# Patient Record
(demographics unavailable — no encounter records)

---

## 2018-04-13 NOTE — XRAY PRELIMINARY REPORT
Accession: [id number]

Exam: XR CHEST 1 VIEW X-RAY

 

IMPRESSION: Small bilateral pleural effusions. Pulmonary venous congestion. Mild diffuse bilateral in
terstitial and hazy opacities concerning for mild pulmonary edema.

 

[facility]

 

SITE ID: [id number]

## 2018-04-13 NOTE — XRAY REPORT
EXAM: 

CHEST RADIOGRAPHY

 

EXAM DATE: 4/13/2018 08:14 PM.

 

CLINICAL HISTORY: Dyspnea.

 

COMPARISON: None.

 

TECHNIQUE: 1 view.

 

FINDINGS: Small bilateral pleural effusions. Pulmonary venous congestion. Mild diffuse bilateral inte
rstitial and hazy opacities concerning for mild pulmonary edema. Prominent heart size. No pneumothora
x.

 

IMPRESSION: Small bilateral pleural effusions. Pulmonary venous congestion. Mild diffuse bilateral in
terstitial and hazy opacities concerning for mild pulmonary edema.

 

[facility]

Referring Provider Line: [phone]

 

SITE ID: [id number]

## 2018-04-13 NOTE — ED PHYSICIAN DOCUMENTATION
PD HPI CHEST PAIN





- Stated complaint


Stated Complaint: NAUSEA/WEAKNESS GENERALIZED





- Chief complaint


Chief Complaint: Ext Problem





- History obtained from


History obtained from: Patient





- History of Present Illness


Timing - onset: Other (70-year-old with history of breast cancer and diabetes 

presents with 2-3 nights of feeling restless and trouble sleeping due to 

shortness of breath and today has profound tachycardia.  Blood sugars also been 

high.)





Review of Systems


Ten Systems: 10 systems reviewed and negative


Constitutional: denies: Fever, Chills


Nose: denies: Rhinorrhea / runny nose, Congestion


Cardiac: reports: Chest pain / pressure, Palpitations


Respiratory: reports: Dyspnea, Cough


GI: reports: Nausea.  denies: Abdominal Pain, Vomiting





PD PAST MEDICAL HISTORY





- Past Medical History


Cardiovascular: High cholesterol


Endocrine/Autoimmune: Type 2 diabetes





- Past Surgical History


/GYN: Mastectomy





- Present Medications


Home Medications: 


 Ambulatory Orders











 Medication  Instructions  Recorded  Confirmed


 


Ascorbic Acid [Vitamin C] 500 mg PO DAILY 09/19/17 03/12/18


 


Cholecalciferol (Vitamin D3) 4,000 unit PO DAILY 09/19/17 03/12/18





[Vitamin D]   


 


Metformin HCl 1,000 mg PO BIDWM 09/19/17 03/12/18


 


Simvastatin 40 mg PO DAILY 09/19/17 03/12/18


 


Vitamin E (Dl,Tocopheryl Acet) 400 unit PO DAILY 09/19/17 03/12/18





[Vitamin E]   


 


glipiZIDE [Glipizide] 10 mg PO QDBREAKFAST 03/12/18 03/12/18


 


Biotin  04/13/18 04/13/18














- Allergies


Allergies/Adverse Reactions: 


 Allergies











Allergy/AdvReac Type Severity Reaction Status Date / Time


 


No Known Drug Allergies Allergy   Verified 04/13/18 19:53














- Social History


Smoking Status: Never smoker





- Family History


Family history: reports: Non contributory





PD ED PE NORMAL





- Vitals


Vital signs reviewed: Yes





- General


General: Alert and oriented X 3, No acute distress





- HEENT


HEENT: PERRL, EOMI





- Neck


Neck: Supple, no meningeal sign, No bony TTP





- Cardiac


Cardiac: Other (Rapid and irregular without murmur)





- Respiratory


Respiratory: No respiratory distress, Clear bilaterally





- Abdomen


Abdomen: Normal bowel sounds, Non tender





- Back


Back: No CVA TTP, No spinal TTP





- Derm


Derm: Normal color, Warm and dry, No rash





- Extremities


Extremities: Other (Slight pitting pedal edema)





- Neuro


Neuro: Alert and oriented X 3, Normal speech





- Psych


Psych: Normal mood, Normal affect





Results





- Vitals


Vitals: 


 Vital Signs - 24 hr











  04/13/18 04/13/18 04/13/18





  19:32 19:47 20:03


 


Temperature 36.9 C 37 C 


 


Heart Rate 175 H 182 H 172 H


 


Respiratory 18 17 35 H





Rate   


 


Blood Pressure 146/101 H 147/104 H 132/106 H


 


O2 Saturation 99 98 95














  04/13/18 04/13/18 04/13/18





  20:14 20:22 20:38


 


Temperature   


 


Heart Rate 155 H 148 H 133 H


 


Respiratory 30 H 31 H 31 H





Rate   


 


Blood Pressure 118/83 H  138/79 H


 


O2 Saturation 93 96 94














  04/13/18 04/13/18 04/13/18





  21:19 21:24 21:31


 


Temperature   


 


Heart Rate 75 74 74


 


Respiratory 33 H 39 H 30 H





Rate   


 


Blood Pressure 98/64 69/45 L 75/52 L


 


O2 Saturation 96 97 96














  04/13/18 04/13/18 04/13/18





  21:34 21:41 21:45


 


Temperature   


 


Heart Rate 74 74 73


 


Respiratory 34 H 30 H 32 H





Rate   


 


Blood Pressure 73/54 L 73/57 L 72/62 L


 


O2 Saturation 96 96 96














  04/13/18





  21:51


 


Temperature 


 


Heart Rate 73


 


Respiratory 35 H





Rate 


 


Blood Pressure 73/57 L


 


O2 Saturation 97








 Oxygen











O2 Source                      Room air

















- EKG (time done)


  ** 1943


Rate: Rate (enter#) (185)


Rhythm: Atrial fibrillation


Axis: Normal


Ischemia: Non specific changes (ST depression, especially laterally, probably 

rate related, also some LVH.)


Computer interpretation: Agree with computer





  ** 2123


Rate: Rate (enter#) (74)


Rhythm: NSR


Axis: Normal


Intervals: Normal WI


QRS: Normal


Ischemia: T wave inversion (V3-V5), Non specific changes.  No: ST elevation c/w 

ischemia


Computer interpretation: Agree with computer





  ** 2205


Rate: Rate (enter#) (77)


Rhythm: NSR


Axis: Normal


QRS: LVH


Ischemia: T wave inversion (V4 through V6, note that the tech says he moved to 

the leads from the second EKG, it is different, but this could be explained by 

that.).  No: ST elevation c/w ischemia


Computer interpretation: Agree with computer





- Labs


Labs: 


 Laboratory Tests











  04/13/18 04/13/18 04/13/18





  19:57 19:57 19:57


 


WBC  12.9 H  


 


RBC  4.84  


 


Hgb  13.6  


 


Hct  41.9  


 


MCV  86.5  


 


MCH  28.2  


 


MCHC  32.6  


 


RDW  13.8  


 


Plt Count  348  


 


MPV  8.7  


 


Neut #  9.3 H  


 


Lymph #  2.7  


 


Mono #  0.8  


 


Eos #  0.0  


 


Baso #  0.1  


 


Absolute Nucleated RBC  0.00  


 


Nucleated RBC %  0.0  


 


PT   13.6 H 


 


INR   1.2 


 


Sodium    133 L


 


Potassium    4.3


 


Chloride    100 L


 


Carbon Dioxide    23


 


Anion Gap    10.0


 


BUN    15


 


Creatinine    0.9


 


Estimated GFR (MDRD)    62 L


 


Glucose    227 H


 


Calcium    9.6


 


Total Bilirubin    0.8


 


AST    35


 


ALT    34


 


Alkaline Phosphatase    69


 


Troponin I   


 


B-Natriuretic Peptide   


 


Total Protein    8.2


 


Albumin    4.5


 


Globulin    3.7


 


Albumin/Globulin Ratio    1.2


 


Lipase    21 L


 


TSH   














  04/13/18 04/13/18 04/13/18





  19:57 19:57 19:57


 


WBC   


 


RBC   


 


Hgb   


 


Hct   


 


MCV   


 


MCH   


 


MCHC   


 


RDW   


 


Plt Count   


 


MPV   


 


Neut #   


 


Lymph #   


 


Mono #   


 


Eos #   


 


Baso #   


 


Absolute Nucleated RBC   


 


Nucleated RBC %   


 


PT   


 


INR   


 


Sodium   


 


Potassium   


 


Chloride   


 


Carbon Dioxide   


 


Anion Gap   


 


BUN   


 


Creatinine   


 


Estimated GFR (MDRD)   


 


Glucose   


 


Calcium   


 


Total Bilirubin   


 


AST   


 


ALT   


 


Alkaline Phosphatase   


 


Troponin I  0.28  


 


B-Natriuretic Peptide   819 H 


 


Total Protein   


 


Albumin   


 


Globulin   


 


Albumin/Globulin Ratio   


 


Lipase   


 


TSH    2.88














- Rads (name of study)


  ** 1v chest


Radiology: EMP read contemporaneously (Small bilateral pleural effusions 

pulmonary venous congestion and mild pulmonary edema.)





PD MEDICAL DECISION MAKING





- ED course


ED course: 





70-year-old woman with acute A. fib, potentially more than a couple of days so 

really would not feel comfortable purposely cardioverting her.  She was 

administered divided doses of diltiazem with improved rate down to about 115-

120 but then she was given some metoprolol and she almost immediately converted 

to a sinus rhythm.  An EKG was performed.  Of note her diagnostic suggested she 

is in CHF and she has mild troponinemia.  She was administered Lovenox here as 

well as aspirin.


Little bit after the metoprolol she did become hypotensive and was administered 

cautious IV fluids.  Spoke with Dr. Pringle for admission at 9:35 PM.


By the time she was admitted to the ICU her blood pressure was almost normal 

again, Dr. Pringle had initially asked me to put in a central line, but I 

preferred to wait given that her blood pressure was rebounding at that point 

and it did continue to rebound.





- Critical Care


Time(min): 42


Time Includes: Direct patient care, Review records, Reassess patient, Document 

care, Coordinate care, Medical consult, Family consult for tx dec


Data interpretation: Labs, Pulse ox


Procedures excluded from critical care time: EKG (x2)





Departure





- Departure


Disposition: [facility] DC/Xfer


Clinical Impression: 


Atrial fibrillation


Qualifiers:


 Atrial fibrillation type: paroxysmal Qualified Code(s): I48.0 - Paroxysmal 

atrial fibrillation





Congestive heart failure


Qualifiers:


 Heart failure type: other Qualified Code(s): I50.9 - Heart failure, unspecified





Condition: Serious


Discharge Date/Time: 04/13/18 22:47

## 2018-04-14 NOTE — PROVIDER PROGRESS NOTE
Subjective





- Prog Note Date


Prog Note Date: 04/14/18


Prog Note Time: 13:30





- Subjective


Pt reports feeling: Improved


Subjective: 





The patient is breathing easier.  She denies any chest pain or any other new 

problems.  She still feels quite weak.  She denies any fever or chills.





Current Medications





- Current Medications


Current Medications: 





Active Medications





Acetaminophen (Tylenol)  650 mg PO Q4HR PRN


   PRN Reason: Pain 1 to 4


Aspirin ([place] Aspirin)  81 mg PO DAILY [facility]


Atorvastatin Calcium (Lipitor)  20 mg PO QPM [facility]


   Last Admin: 04/14/18 00:05 Dose:  20 mg


Carvedilol (Coreg)  3.125 mg PO BID [facility]


Cholecalciferol (Vitamin D3)  4,000 unit PO DAILY [facility]


   Last Admin: 04/14/18 08:14 Dose:  4,000 unit


Enoxaparin Sodium (Lovenox)  70 mg SUBQ BID [facility]


   Last Admin: 04/14/18 08:57 Dose:  70 mg


Furosemide (Lasix)  20 mg PO DAILY [facility]


Glipizide (Glucotrol)  10 mg PO 0730 [facility]


   Last Admin: 04/14/18 08:13 Dose:  10 mg


Ibuprofen (Motrin)  600 mg PO Q6HR PRN


   PRN Reason: PAIN


   Last Admin: 04/14/18 09:13 Dose:  600 mg


Insulin Aspart (Novolog)  1 - 9 unit SUBQ 0800,1200,1700,2100 [facility]


   PRN Reason: Protocol


Lisinopril (Zestril)  5 mg PO DAILY [facility]


Metformin HCl (Glucophage)  1,000 mg PO BIDWM [facility]


   Last Admin: 04/14/18 08:14 Dose:  1,000 mg


Metformin HCl (Glucophage)  500 mg PO DAILY@1200 [facility]


   Last Admin: 04/14/18 11:57 Dose:  500 mg


Morphine Sulfate (Morphine)  2 mg IVP Q2H PRN


   PRN Reason: Dyspnea


   Last Admin: 04/14/18 05:43 Dose:  2 mg


Ondansetron HCl (Zofran Inj)  4 mg IVP Q6HR PRN


   PRN Reason: Nausea / Vomiting


   Last Admin: 04/14/18 05:45 Dose:  4 mg


Pantoprazole Sodium (Protonix)  40 mg IVP BID [facility]


   Last Admin: 04/14/18 08:22 Dose:  40 mg


Phenol/Menthol (Chloraseptic)  2 sprays MM Q2HR PRN


   PRN Reason: Throat Pain


Potassium Chloride (K-Dur)  10 meq PO DAILYWM [facility]


Sodium Chloride (Normal Saline Flush 0.9%)  10 ml IVP 0100,0900,1700 [facility]


   Last Admin: 04/14/18 08:22 Dose:  10 ml


Sodium Chloride (Normal Saline Flush 0.9%)  10 ml IVP PRN PRN


   PRN Reason: AS NEEDED PER PROVIDER ORDERS


   Last Admin: 04/14/18 05:43 Dose:  10 ml


Throat Lozenges (Cepacol)  1 lozenge MM Q2HR PRN


   PRN Reason: Throat pain


   Last Admin: 04/14/18 05:03 Dose:  1 lozenge





Home medications:


 





Ascorbic Acid [Vitamin C] 500 mg PO DAILY 09/19/17 


Cholecalciferol (Vitamin D3) [Vitamin D] 4,000 unit PO DAILY 09/19/17 


Metformin HCl 1,000 mg PO BIDWM 09/19/17 


Simvastatin 40 mg PO QPM 09/19/17 


Vitamin E (Dl,Tocopheryl Acet) [Vitamin E] 400 unit PO DAILY 09/19/17 


glipiZIDE [Glipizide] 10 mg PO 0730 03/12/18 


Metformin HCl 500 mg PO 1200 04/14/18 











Objective





- Vital Signs/Intake & Output


Reviewed Vital Signs: Yes


Vital Signs: 


 Vital Signs x48h











  Temp Pulse Resp BP Pulse Ox


 


 04/14/18 14:00   85  22  94/57 L  97


 


 04/14/18 13:00   87  29 H  93/67  99


 


 04/14/18 12:00  36.8 C  88  19  96/66  97


 


 04/14/18 11:00   88  30 H  106/76  98


 


 04/14/18 10:00   92  24  129/75  95


 


 04/14/18 09:00  36.7 C  99  29 H  113/99 H  97


 


 04/14/18 08:00   112 H  25 H  133/63 H  96


 


 04/14/18 07:00   119 H  35 H  129/100 H  93











Intake & Output: 


 Intake & Output











 04/11/18 04/12/18 04/13/18 04/14/18





 23:59 23:59 23:59 23:59


 


Intake Total   1410 1198.333


 


Output Total    750


 


Balance   1410 448.333














- Objective


General Appearance: positive: No acute distress, Alert


Eyes Bilateral: positive: Normal inspection, PERRL, EOMI, No lid inflammation, 

Conjunctivae nml, No scleral icterus


ENT: positive: ENT inspection nml, Pharynx nml, No signs of dehydration


Neck: positive: Nml inspection, Thyroid nml, Trachea midline.  negative: 

Thyromegaly


Respiratory: positive: Chest non-tender, No respiratory distress, Breath sounds 

nml.  negative: Wheezes, Rales, Rhonchi


Cardiovascular: positive: Regular rate & rhythm, No murmur, No gallop


Abdomen: positive: Non-tender, No organomegaly, Nml bowel sounds, No 

distention.  negative: Guarding, Rebound


Back: positive: Nml inspection.  negative: CVA tenderness (R), CVA tenderness (L

)


Skin: positive: Color nml, No rash, Warm, Dry.  negative: Cyanosis


Extremities: positive: Non-tender, Full ROM, Nml appearance, Pedal edema (Trace 

pedal edema)


Neurologic/Psychiatric: positive: Oriented x3, CN's nml (2-12), Motor nml, 

Sensation nml, Mood/affect nml





- Lab Results


Fish Bones: 


 04/14/18 05:00





 04/14/18 05:00


Other Labs: 


 Lab Results x24hrs











  04/14/18 04/14/18 04/14/18 Range/Units





  11:28 08:08 05:00 


 


WBC     (4.8-10.8)  x10^3/uL


 


RBC     (4.20-5.40)  10^6/uL


 


Hgb     (12.0-16.0)  g/dL


 


Hct     (37.0-47.0)  %


 


MCV     (81.0-99.0)  fL


 


MCH     (27.0-31.0)  pg


 


MCHC     (32.0-36.0)  g/dL


 


RDW     (12.0-15.0)  %


 


Plt Count     (130-450)  10^3/uL


 


MPV     (7.9-10.8)  fL


 


Neut #     (1.5-6.6)  10^3/uL


 


Lymph #     (1.5-3.5)  10^3/uL


 


Mono #     (0.0-1.0)  10^3/uL


 


Eos #     (0.0-0.7)  10^3/uL


 


Baso #     (0.0-0.1)  10^3/uL


 


Absolute Nucleated RBC     x10^3/uL


 


Nucleated RBC %     /100WBC


 


Sodium     (135-145)  mmol/L


 


Potassium     (3.5-5.0)  mmol/L


 


Chloride     (101-111)  mmol/L


 


Carbon Dioxide     (21-32)  mmol/L


 


Anion Gap     (6-13)  


 


BUN     (6-20)  mg/dL


 


Creatinine     (0.4-1.0)  mg/dL


 


Estimated GFR (MDRD)     (>89)  


 


Glucose     ()  mg/dL


 


POC Whole Bld Glucose  274 H  265 H   (70 - 100)  mg/dL


 


Glycated Hemoglobin    6.6 H  (4.6-6.2)  %


 


Estim Average Glucose    143 H  ()  


 


Calcium     (8.5-10.3)  mg/dL


 


Phosphorus     (2.5-4.6)  mg/dL


 


Magnesium     (1.7-2.8)  mg/dL


 


Troponin I     (<0.49)  ng/mL














  04/14/18 04/14/18 04/14/18 Range/Units





  05:00 05:00 05:00 


 


WBC    10.9 H  (4.8-10.8)  x10^3/uL


 


RBC    4.21  (4.20-5.40)  10^6/uL


 


Hgb    12.2  (12.0-16.0)  g/dL


 


Hct    37.2  (37.0-47.0)  %


 


MCV    88.4  (81.0-99.0)  fL


 


MCH    28.9  (27.0-31.0)  pg


 


MCHC    32.7  (32.0-36.0)  g/dL


 


RDW    14.1  (12.0-15.0)  %


 


Plt Count    254  (130-450)  10^3/uL


 


MPV    9.8  (7.9-10.8)  fL


 


Neut #    8.3 H  (1.5-6.6)  10^3/uL


 


Lymph #    1.6  (1.5-3.5)  10^3/uL


 


Mono #    0.8  (0.0-1.0)  10^3/uL


 


Eos #    0.0  (0.0-0.7)  10^3/uL


 


Baso #    0.1  (0.0-0.1)  10^3/uL


 


Absolute Nucleated RBC    0.01  x10^3/uL


 


Nucleated RBC %    0.1  /100WBC


 


Sodium   134 L   (135-145)  mmol/L


 


Potassium   3.3 L   (3.5-5.0)  mmol/L


 


Chloride   100 L   (101-111)  mmol/L


 


Carbon Dioxide   20 L   (21-32)  mmol/L


 


Anion Gap   14.0 H   (6-13)  


 


BUN   14   (6-20)  mg/dL


 


Creatinine   0.6   (0.4-1.0)  mg/dL


 


Estimated GFR (MDRD)   99   (>89)  


 


Glucose   246 H   ()  mg/dL


 


POC Whole Bld Glucose     (70 - 100)  mg/dL


 


Glycated Hemoglobin     (4.6-6.2)  %


 


Estim Average Glucose     ()  


 


Calcium   8.8   (8.5-10.3)  mg/dL


 


Phosphorus   4.0   (2.5-4.6)  mg/dL


 


Magnesium   1.1 L   (1.7-2.8)  mg/dL


 


Troponin I  0.19    (<0.49)  ng/mL














  04/13/18 04/13/18 Range/Units





  22:59 22:14 


 


WBC    (4.8-10.8)  x10^3/uL


 


RBC    (4.20-5.40)  10^6/uL


 


Hgb    (12.0-16.0)  g/dL


 


Hct    (37.0-47.0)  %


 


MCV    (81.0-99.0)  fL


 


MCH    (27.0-31.0)  pg


 


MCHC    (32.0-36.0)  g/dL


 


RDW    (12.0-15.0)  %


 


Plt Count    (130-450)  10^3/uL


 


MPV    (7.9-10.8)  fL


 


Neut #    (1.5-6.6)  10^3/uL


 


Lymph #    (1.5-3.5)  10^3/uL


 


Mono #    (0.0-1.0)  10^3/uL


 


Eos #    (0.0-0.7)  10^3/uL


 


Baso #    (0.0-0.1)  10^3/uL


 


Absolute Nucleated RBC    x10^3/uL


 


Nucleated RBC %    /100WBC


 


Sodium    (135-145)  mmol/L


 


Potassium    (3.5-5.0)  mmol/L


 


Chloride    (101-111)  mmol/L


 


Carbon Dioxide    (21-32)  mmol/L


 


Anion Gap    (6-13)  


 


BUN    (6-20)  mg/dL


 


Creatinine    (0.4-1.0)  mg/dL


 


Estimated GFR (MDRD)    (>89)  


 


Glucose    ()  mg/dL


 


POC Whole Bld Glucose  273 H   (70 - 100)  mg/dL


 


Glycated Hemoglobin    (4.6-6.2)  %


 


Estim Average Glucose    ()  


 


Calcium    (8.5-10.3)  mg/dL


 


Phosphorus    (2.5-4.6)  mg/dL


 


Magnesium    (1.7-2.8)  mg/dL


 


Troponin I   0.19  (<0.49)  ng/mL














- Diagnostic Imaging


Diagnostic Imaging Results: positive: Prelim report reviewed


Diagnostic Imaging Comments: 


EXAM: 


CHEST RADIOGRAPHY 





EXAM DATE: 4/14/2018 08:57 AM. 





CLINICAL HISTORY: F/U pulmonary edema. 





COMPARISON: 04/13/2018. 





TECHNIQUE: 1 view. 





FINDINGS: 


Lungs/Pleura: Lung volumes are low. Mild increase blunting at the costophrenic 

angles with mild 


increased adjacent groundglass opacity. No pneumothorax. 





Mediastinum: Cardiac silhouette size appears stable mild enlarged, although 

partially obscured. 


Atherosclerotic vascular calcification. 





Other: None. 





IMPRESSION: Lung volumes remain low with mild increased small bilateral pleural 

effusions and 


adjacent groundglass opacity that may reflect a combination of edema, 

atelectasis and/or pneumonia 


in the proper settings. 








EXAM: 


CHEST RADIOGRAPHY 





EXAM DATE: 4/13/2018 08:14 PM. 





CLINICAL HISTORY: Dyspnea. 





COMPARISON: None. 





TECHNIQUE: 1 view. 





FINDINGS: Small bilateral pleural effusions. Pulmonary venous congestion. Mild 

diffuse bilateral 


interstitial and hazy opacities concerning for mild pulmonary edema. Prominent 

heart size. No 


pneumothorax. 





IMPRESSION: Small bilateral pleural effusions. Pulmonary venous congestion. 

Mild diffuse bilateral 


interstitial and hazy opacities concerning for mild pulmonary edema. 














Assessment/Plan





- Problem List


(1) Congestive heart failure


Impression: 


The patient presented to [facility] emergency department in congestive 

heart failure.  Subsequent testing has found her to have an ejection fraction 

of less than 20% with associated right-sided heart failure.  We are gently 

diuresing the patient and have started her on Coreg, lisinopril, and a baby 

aspirin.


Qualifiers: 


   Heart failure type: combined systolic and diastolic   Qualified Code(s): 

I50.9 - Heart failure, unspecified   





(2) Atrial fibrillation


Impression: 


The patient converted to normal sinus rhythm last night with the use of 

metoprolol.  We will stop metoprolol and place patient on Coreg for the alpha 

and beta blocking effects.


Qualifiers: 


   Atrial fibrillation type: paroxysmal   Qualified Code(s): I48.0 - Paroxysmal 

atrial fibrillation   





(3) Hyperlipidemia


Impression: 


The patient will continue taking 40 mg of simvastatin daily.








(4) Type 2 diabetes mellitus


Impression: 


We will continue the patient on glipizide and metformin with sliding scale 

coverage.

## 2018-04-14 NOTE — XRAY PRELIMINARY REPORT
Accession: [id number]

Exam: XR CHEST 1 VIEW X-RAY

 

IMPRESSION: Lung volumes remain low with mild increased small bilateral pleural effusions and adjacen
t groundglass opacity that may reflect a combination of edema, atelectasis and/or pneumonia in the pr
oper settings.

 

[facility]

 

SITE ID: [id number]

## 2018-04-14 NOTE — XRAY REPORT
EXAM: 

CHEST RADIOGRAPHY

 

EXAM DATE: 4/14/2018 08:57 AM.

 

CLINICAL HISTORY: F/U pulmonary edema.

 

COMPARISON: 04/13/2018.

 

TECHNIQUE: 1 view.

 

FINDINGS:

Lungs/Pleura: Lung volumes are low. Mild increase blunting at the costophrenic angles with mild incre
ased adjacent groundglass opacity. No pneumothorax.

 

Mediastinum: Cardiac silhouette size appears stable mild enlarged, although partially obscured. Ather
osclerotic vascular calcification.

 

Other: None.

 

IMPRESSION: Lung volumes remain low with mild increased small bilateral pleural effusions and adjacen
t groundglass opacity that may reflect a combination of edema, atelectasis and/or pneumonia in the pr
oper settings.

 

[facility]

Referring Provider Line: [phone]

 

SITE ID: [id number]

## 2018-04-14 NOTE — HISTORY & PHYSICAL EXAMINATION
DATE OF SERVICE: 04/13/2018

Physician: Terri Swann MD

 

HISTORY OF PRESENT ILLNESS:  This is a 70-year-old, white female with a history 
of breast 

cancer for which she had chemotherapy that was finished 3 years ago.  She did 
have 

echocardiograms done every 3 months then to ensure there was no heart failure.  
She has mild 

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  She also has a history 
of diabetes on 

oral agents only.

 

The patient presents with a 3-day feeling of rapid heart racing and extreme 
weakness, and new 

dyspnea on exertion, as well as orthopnea.  On this third day today she was 
going to see her 

doctor, but the office was closed, and she decided not to wait until Monday and 
presented to 

the emergency room.  She was found to be in atrial fibrillation with a rapid 
rate of 180.  She 

received Cardizem 10 mg IV twice with no effect, except slightly slowing the 
heart rate into 

the 120s, and then she received metoprolol 5 mg IV, which had helped to convert 
to sinus rhythm

with rates in the 80s, but also dropped her pressure from 140 systolic to 69 
systolic.  Despite

having pulmonary edema on chest x-ray and clinical signs consistent with that, 
she started to 

receive IV fluids of saline wide open, and after approximately 1.5 liters her 
blood pressure 

has improved into the 89 to 99 range systolic.  The patient is being admitted 
to the ICU.

 

PAST MEDICAL HISTORY:  Breast cancer, had several chemotherapy agents, finished 
3 years ago.  

Diabetes, on oral agents.

 

ALLERGIES:  NONE.

 

MEDICATIONS

1.  Metformin 1000 mg b.i.d. and 500 mg with lunch.

2.  Glipizide 10 mg every a.m.

3.  Vitamin C daily.

4.  Biotin daily.

5.  Vitamin E daily.

6.  Vitamin D3 daily.

7.  Simvastatin 40 mg Daily.

8.  She was recently on Micardis HCT for hypertension, currently not.

 

FAMILY HISTORY:  No inherited diseases.

 

SOCIAL HISTORY:  She is a nonsmoker, drinks rare alcohol, uses no illicit 
drugs.  She lives 

with her  who is about to retire.

 

REVIEW OF SYSTEMS:  A comprehensive review of systems was performed and the 
pertinent positives

are as above.

 

PHYSICAL EXAMINATION

GENERAL:  White female who is in no distress.

VITAL SIGNS:  Blood pressure 110/73, heart rate 88 in sinus rhythm, afebrile, 
oxygen saturation

98% on 2 liters nasal cannula.

HEENT:  Unremarkable.  Oral mucosa is moist.

NECK:  No JVD at a 45 degree upright angle.  No carotid bruits.

CHEST:  Diminished breath sounds, but clear with no rales or wheezes.

HEART:  Heart sounds are distant.  No audible murmurs.

ABDOMEN:  Soft, nontender.  No organomegaly.

EXTREMITIES:  Show no edema.  No clubbing or cyanosis.

NEUROLOGIC:  Grossly intact.

 

LABORATORIES:  Sodium 133, potassium 4.3, BUN 15, creatinine 0.9, glucose 273.  
Normal liver 

tests.  Troponin is 0.28 and then down to 0.19.  BNP is 819.  TSH normal at 
2.88.  White blood 

count 12.9 with a left shift, hemoglobin 13.6, platelet count normal at 348.  
INR normal.  No 

urinalysis was done.

 

Chest x-ray:  Mild pulmonary edema.  EKG:  Atrial fibrillation with LVH with a 
strain pattern 

versus ischemic ST changes diffusely.  EKG #2:  Sinus rhythm with left atrial 
enlargement, LVH 

with strain.

 

IMPRESSION/DIAGNOSES

1.  New onset atrial fibrillation with rapid ventricular rate.

2.  Hypotension.

3.  Pulmonary edema.

4.  Diabetes, on oral agents.

5.  Breast cancer, status post chemotherapy 3 years ago.

 

PLAN:  Admit the patient to the ICU on telemetry. Monitor vital signs closely, 
consider a CVP and 

pressors. Stop the IV hydration at this rapid rate because of the pulmonary 
edema and give Lasix 

IV gingerly to treat the pulmonary edema, Solis catheter if needed for Ins and 
Outs measurement. 

Cycle troponins to ensure she is ruling out for an MI.  Currently it appears 
that the atrial fibrillation likely 

caused the elevation of troponins, suggesting demand ischemia. Obtain an Echo 
to evaluate LV and RV 

contractility.  Start low doses of p.o. beta blockers which appeared to be the 
medicine that 

did control the rate and helped convert her.  This patient's CHADS score is 2 
for diabetes and 

hypertension, and depending if she has CHF on Echo, her CHADS score would be 3.
  This means 

that stroke prophylaxis that is recommended is with anticoagulants, rather than 
aspirin. Therefore, 

start Lovenox at therapeutic dose tonight and change to Eliquis tomorrow or 
other oral agent. The 

choice will depend on her insurance coverage and out-of-pocket cost.  Continue 
with her 

diabetic medications, start a diabetic diet, start insulin rainbow coverage.  
Obtain an A1c to 

assess control.  The patient should have cardiac testing for ischemia with a 
stress test after 

she has stabilized from this admission.  Alternatively, if the Echo shows some 
significant 

findings, an angiogram may be the next direct step for aggressive management of 
a cardiomyopathy.

 

DEEP VENOUS THROMBOSIS PROPHYLAXIS:  Pharmacotherapy.

 

CODE STATUS:  FULL CODE.

 

ATTESTATION:  The patient is expected to be discharged or transferred to 
another facility 

within 96 hours:  Yes.

 

 

DD: 04/13/2018 23:39

TD: 04/14/2018 03:31

Job #: [id number]

[id number]

## 2018-04-15 NOTE — DISCHARGE SUMMARY
Discharge Summary


Admit Date: 04/13/18


Discharge Date: 04/15/18


Discharging Provider: Sarai Guerrero DO


Primary Care Provider: Greg Mcdaniels DO


Code Status: Attempt Resuscitation


Condition at Discharge: Serious


Discharge Disposition: 02 Transfer Acute Care Hosp


[facility]





- DIAGNOSES


Admission Diagnoses: 





1.  New onset atrial fibrillation with rapid ventricular response.  


2. Hypotension 


3.  Pulmonary edema 


4.  Diabetes 


5.  Breast cancer status post chemotherapy 3 years ago


Discharge Diagnoses with Status of Each Condition: 





1.  New onset atrial fibrillation with rapid ventricular response, resolved.  


2.  Cardiomyopathy, newly diagnosed.


3.  Hypotension, Possibly cardiogenic shock 


4.  Pulmonary edema with symptomatic PND and orthopnea


5.  Nausea, possibly due to gut hypoperfusion. 


6.  Breast cancer status post chemotherapy 3 years ago





- HPI


History of Present Illness: 





From Dr. Swann's note: The patient is a 70-year-old white female with a 

history of breast cancer for which she had chemotherapy that was finished 3 

years ago.  She did have echocardiograms done every 3 months at that time to 

ensure that there was no chemotherapy-induced heart failure.  She has mild 

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  She also has a history 

of diabetes and is on oral agents only.  The patient presents with a three-day 

feeling of rapid heart racing and extreme weakness, and her new dyspnea on 

exertion as well as orthopnea.  On this third day today she was going to see 

her doctor, but the office was closed and so she decided to come to the 

emergency room instead of waiting through the weekend.  She was found to be in 

atrial fibrillation with a rapid rate of 180.  She received Cardizem 10 mg IV 

twice with no effect except slightly slowing the heart rate into the 120s and 

then she received metoprolol 5 mg IV which helped to convert her to sinus 

rhythm with rates in the 80s, but also dropped her pressure from 140 systolic 

to 69 systolic.  Despite having pulmonary edema on chest x-ray and clinical 

signs consistent with that, she started to receive IV fluids of saline wide open

, and after approximately 1/2 L her blood pressure has improved in the 89-99 

range systolically.  The patient is being admitted to the intensive care unit.





- HOSPITAL COURSE


Hospital Course: 


The patient was admitted to the intensive care unit from the emergency 

department and converted to sinus rhythm shortly afterwards.  The following 

morning an echocardiogram was performed which showed global left ventricular 

hypokinesis with an ejection fraction of less than 20%.  Patient was started on 

low-dose lisinopril and Coreg, aspirin and spironolactone however her blood 

pressure dropped fairly precipitously and she had to be placed on a dobutamine 

drip.  Because of her cardiomyopathy and recent history it was felt that it 

would be prudent that the patient be transferred to a facility where she could 

undergo cor angiography.  The patient will be transferred to [facility] in [facility] under the care of Dr. Noah Giordano, intensivist.








- ALLERGIES


Allergies/Adverse Reactions: 


 Allergies











Allergy/AdvReac Type Severity Reaction Status Date / Time


 


No Known Drug Allergies Allergy   Verified 04/13/18 19:53














- MEDICATIONS


Home Medications: 


 Ambulatory Orders











 Medication  Instructions  Recorded  Confirmed


 


Ascorbic Acid [Vitamin C] 500 mg PO DAILY 09/19/17 04/14/18


 


Cholecalciferol (Vitamin D3) 4,000 unit PO DAILY 09/19/17 04/14/18





[Vitamin D]   


 


Metformin HCl 1,000 mg PO BIDWM 09/19/17 04/14/18


 


Simvastatin 40 mg PO QPM 09/19/17 04/14/18


 


Vitamin E (Dl,Tocopheryl Acet) 400 unit PO DAILY 09/19/17 04/14/18





[Vitamin E]   


 


glipiZIDE [Glipizide] 10 mg PO 0730 03/12/18 04/14/18


 


Metformin HCl 500 mg PO 1200 04/14/18 04/14/18














- PHYSICAL EXAM AT DISCHARGE


General Appearance: positive: No acute distress, Alert


Eyes Bilateral: positive: Normal inspection, PERRL, EOMI, No lid inflammation, 

Conjunctivae nml, No scleral icterus


ENT: positive: ENT inspection nml, Pharynx nml, No signs of dehydration


Neck: positive: Nml inspection, Thyroid nml, No JVD, Trachea midline.  negative

: Thyromegaly


Respiratory: positive: Chest non-tender, No respiratory distress, Breath sounds 

nml.  negative: Wheezes, Rales, Rhonchi


Cardiovascular: positive: Regular rate & rhythm, No murmur, No gallop


Peripheral Pulses: positive: 1+


Abdomen: positive: Non-tender, No organomegaly, Nml bowel sounds, No 

distention.  negative: Guarding, Rebound


Back: positive: Nml inspection.  negative: CVA tenderness (R), CVA tenderness (L

)


Skin: positive: Color nml, No rash, Warm, Dry.  negative: Cyanosis


Extremities: positive: Non-tender, Full ROM, Nml appearance, Pedal edema


Neurologic/Psychiatric: positive: Oriented x3, CN's nml (2-12), Motor nml, 

Sensation nml, Mood/affect nml





- LABS


Result Diagrams: 


 04/14/18 05:00





 04/15/18 05:20





- DIAGNOSTIC IMAGING


Diagnostic Imaging Results: Other (Nocternist/cardiologist note)


Diagnostic Imaging Results Comments: 





[address] [address]


   [phone]





   Hospitalist Nocturnist Note





Patient Name: ROSEMARIE JAMES   Medical Record Number: [id number]


YOB: 1947   Patient Status: Inpatient


Attending Provider: Terri Swann   Account Number: [id number]


Date: 04/15/18 00:29   Initialization Date: 04/15/18 00:29








Nocturnist Note





- Nocturnist Note


Nocturnist Note: 


RN asked me to see Pt with Low BP


BP has been 85-89 systolic all day


BP has dropped to 75 systolic after Coreg and Lisinopril doses given this 

evening


HR is 88-90 in NSR





Pt is sitting up in chair and eyes keep closing as she is speaking to me. She 

says this is from "being tired, not getting good sleep for several nights."


She says she doesn't want to lay down "because she gets nauseated, even last 

few nights at home."


She is asking what the plan is from here.


Exam:


Chest diminished BS


Heart no murmur





Echo done today shows global LV hypokinesis, EF <20%.





Imp/Dx:


1) New onset Afib, resolved.


2) Cardiomyopathy, newly diagnosed.


3) Hypotension, possibly cardiogenic shock.


4) Pulmonary edema with symptomatic PND and orthopnea.


5) Nausea, possibly due to gut hypoperfusion


6) Hx of breast CA





Plan:


I explained the meds and management of cardiomyopathy to patient and her 

 who was at bedside, and that the meds can cause hypotension or that her 

weak heart may be cause of the low BP.


Recommend iv Dobutamine via a CVP line. Pt agreeable to have CVP placed, will 

call Anesthesia.


Will decrease Lisinopril dose from 5 mg to 2.5 mg, spread ot her meds.


Add Spironolactone.


Transfer patient tomorrow to higher level of care center, for cor angio and 

eval of new cardiomyopathy. Pt is agreeable.


She had a Cardiologist in [place] but none since living here the past 3 years. 

Her last echo from [place] would be of interest, to confirm that she had a 

normal EF on that chemo agent.











- TIME SPENT


Time Spent in Discharge (Minutes): 45

## 2018-04-15 NOTE — XRAY REPORT
EXAM: 

CHEST RADIOGRAPHY

 

EXAM DATE: 4/15/2018 12:29 AM.

 

CLINICAL HISTORY: Central line placement.

 

COMPARISON: 04/14/2018.

 

TECHNIQUE: 1 view.

 

FINDINGS:

Lungs/Pleura: Persistent pulmonary opacities and pleural effusions. No pneumothorax seen.

 

Mediastinum: Heart appears mildly enlarged. Aortic atherosclerosis.

 

Other: Right internal jugular catheter with the tip in the lower SVC.

 

IMPRESSION: 

1. Right IJ catheter tip in the lower SVC. 

2. Cardiomegaly with persistent pulmonary opacities and pleural effusions.

 

[facility]

Referring Provider Line: [phone]

 

SITE ID: [id number]

## 2018-04-15 NOTE — PROVIDER PROGRESS NOTE
Nocturnist Note





- Nocturnist Note


Nocturnist Note: 


RN asked me to see Pt with Low BP


BP has been 85-89 systolic all day


BP has dropped to 75 systolic after Coreg and Lisinopril doses given this 

evening


HR is 88-90 in NSR





Pt is sitting up in chair and eyes keep closing as she is speaking to me. She 

says this is from "being tired, not getting good sleep for several nights."


She says she doesn't want to lay down "because she gets nauseated, even last 

few nights at home."


She is asking what the plan is from here.


Exam:


Chest diminished BS


Heart no murmur





Echo done today shows global LV hypokinesis, EF <20%.





Imp/Dx:


1) New onset Afib, resolved.


2) Cardiomyopathy, newly diagnosed.


3) Hypotension, possibly cardiogenic shock.


4) Pulmonary edema with symptomatic PND and orthopnea.


5) Nausea, possibly due to gut hypoperfusion


6) Hx of breast CA





Plan:


I explained the meds and management of cardiomyopathy to patient and her 

 who was at bedside, and that the meds can cause hypotension or that her 

weak heart may be cause of the low BP.


Recommend iv Dobutamine via a CVP line. Pt agreeable to have CVP placed, will 

call Anesthesia.


Will decrease Lisinopril dose from 5 mg to 2.5 mg, spread ot her meds.


Add Spironolactone.


Transfer patient tomorrow to higher level of care center, for cor angio and 

eval of new cardiomyopathy. Pt is agreeable.


She had a Cardiologist in [place] but none since living here the past 3 years. 

Her last echo from [place] would be of interest, to confirm that she had a 

normal EF on that chemo agent.

## 2018-04-15 NOTE — XRAY PRELIMINARY REPORT
Accession: [id number]

Exam: XR CHEST 1 VIEW X-RAY

 

IMPRESSION: 

1. Right IJ catheter tip in the lower SVC. 

2. Cardiomegaly with persistent pulmonary opacities and pleural effusions.

 

[facility] [place]

 

SITE ID: [id number]

## 2018-04-15 NOTE — DISCHARGE PLAN
Discharge Plan


Disposition: 02 Transfer Acute Care Hosp


Condition: Serious


Diet: Cardiac


Activity Restrictions: Activity as Tolerated


Shower Restrictions: No


Driving Restrictions: No


Weight Bearing: Full Weight


Instruction Topics:  Metoprolol extended-release tablets


No Smoking: If you smoke, Please STOP!  Call [phone] for help.


Follow-up with: 


Greg Mcdaniels MD [Primary Care Provider] -